# Patient Record
Sex: FEMALE | Race: BLACK OR AFRICAN AMERICAN | Employment: FULL TIME | URBAN - METROPOLITAN AREA
[De-identification: names, ages, dates, MRNs, and addresses within clinical notes are randomized per-mention and may not be internally consistent; named-entity substitution may affect disease eponyms.]

---

## 2018-12-19 ENCOUNTER — OFFICE VISIT (OUTPATIENT)
Dept: FAMILY MEDICINE CLINIC | Age: 63
End: 2018-12-19

## 2018-12-19 ENCOUNTER — TELEPHONE (OUTPATIENT)
Dept: FAMILY MEDICINE CLINIC | Age: 63
End: 2018-12-19

## 2018-12-19 VITALS
HEIGHT: 62 IN | DIASTOLIC BLOOD PRESSURE: 81 MMHG | HEART RATE: 75 BPM | SYSTOLIC BLOOD PRESSURE: 182 MMHG | BODY MASS INDEX: 45.27 KG/M2 | WEIGHT: 246 LBS | TEMPERATURE: 98.5 F

## 2018-12-19 DIAGNOSIS — I10 ESSENTIAL HYPERTENSION: ICD-10-CM

## 2018-12-19 DIAGNOSIS — M65.9 TENOSYNOVITIS OF LEFT HAND: Primary | ICD-10-CM

## 2018-12-19 PROBLEM — E66.01 OBESITY, MORBID (HCC): Status: ACTIVE | Noted: 2018-12-19

## 2018-12-19 RX ORDER — LISINOPRIL 40 MG/1
40 TABLET ORAL DAILY
Qty: 90 TAB | Refills: 3 | Status: SHIPPED | OUTPATIENT
Start: 2018-12-19 | End: 2019-10-25 | Stop reason: SDUPTHER

## 2018-12-19 NOTE — PATIENT INSTRUCTIONS
DASH Diet: Care Instructions  Your Care Instructions    The DASH diet is an eating plan that can help lower your blood pressure. DASH stands for Dietary Approaches to Stop Hypertension. Hypertension is high blood pressure. The DASH diet focuses on eating foods that are high in calcium, potassium, and magnesium. These nutrients can lower blood pressure. The foods that are highest in these nutrients are fruits, vegetables, low-fat dairy products, nuts, seeds, and legumes. But taking calcium, potassium, and magnesium supplements instead of eating foods that are high in those nutrients does not have the same effect. The DASH diet also includes whole grains, fish, and poultry. The DASH diet is one of several lifestyle changes your doctor may recommend to lower your high blood pressure. Your doctor may also want you to decrease the amount of sodium in your diet. Lowering sodium while following the DASH diet can lower blood pressure even further than just the DASH diet alone. Follow-up care is a key part of your treatment and safety. Be sure to make and go to all appointments, and call your doctor if you are having problems. It's also a good idea to know your test results and keep a list of the medicines you take. How can you care for yourself at home? Following the DASH diet  · Eat 4 to 5 servings of fruit each day. A serving is 1 medium-sized piece of fruit, ½ cup chopped or canned fruit, 1/4 cup dried fruit, or 4 ounces (½ cup) of fruit juice. Choose fruit more often than fruit juice. · Eat 4 to 5 servings of vegetables each day. A serving is 1 cup of lettuce or raw leafy vegetables, ½ cup of chopped or cooked vegetables, or 4 ounces (½ cup) of vegetable juice. Choose vegetables more often than vegetable juice. · Get 2 to 3 servings of low-fat and fat-free dairy each day. A serving is 8 ounces of milk, 1 cup of yogurt, or 1 ½ ounces of cheese. · Eat 6 to 8 servings of grains each day.  A serving is 1 slice of bread, 1 ounce of dry cereal, or ½ cup of cooked rice, pasta, or cooked cereal. Try to choose whole-grain products as much as possible. · Limit lean meat, poultry, and fish to 2 servings each day. A serving is 3 ounces, about the size of a deck of cards. · Eat 4 to 5 servings of nuts, seeds, and legumes (cooked dried beans, lentils, and split peas) each week. A serving is 1/3 cup of nuts, 2 tablespoons of seeds, or ½ cup of cooked beans or peas. · Limit fats and oils to 2 to 3 servings each day. A serving is 1 teaspoon of vegetable oil or 2 tablespoons of salad dressing. · Limit sweets and added sugars to 5 servings or less a week. A serving is 1 tablespoon jelly or jam, ½ cup sorbet, or 1 cup of lemonade. · Eat less than 2,300 milligrams (mg) of sodium a day. If you limit your sodium to 1,500 mg a day, you can lower your blood pressure even more. Tips for success  · Start small. Do not try to make dramatic changes to your diet all at once. You might feel that you are missing out on your favorite foods and then be more likely to not follow the plan. Make small changes, and stick with them. Once those changes become habit, add a few more changes. · Try some of the following:  ? Make it a goal to eat a fruit or vegetable at every meal and at snacks. This will make it easy to get the recommended amount of fruits and vegetables each day. ? Try yogurt topped with fruit and nuts for a snack or healthy dessert. ? Add lettuce, tomato, cucumber, and onion to sandwiches. ? Combine a ready-made pizza crust with low-fat mozzarella cheese and lots of vegetable toppings. Try using tomatoes, squash, spinach, broccoli, carrots, cauliflower, and onions. ? Have a variety of cut-up vegetables with a low-fat dip as an appetizer instead of chips and dip. ? Sprinkle sunflower seeds or chopped almonds over salads. Or try adding chopped walnuts or almonds to cooked vegetables.   ? Try some vegetarian meals using beans and peas. Add garbanzo or kidney beans to salads. Make burritos and tacos with mashed arauz beans or black beans. Where can you learn more? Go to http://adwoa-david.info/. Enter J846 in the search box to learn more about \"DASH Diet: Care Instructions. \"  Current as of: December 6, 2017  Content Version: 11.8  © 1674-1936 [employer]. Care instructions adapted under license by [employer] (which disclaims liability or warranty for this information). If you have questions about a medical condition or this instruction, always ask your healthcare professional. [address] any warranty or liability for your use of this information.

## 2018-12-19 NOTE — PROGRESS NOTES
HISTORY OF PRESENT ILLNESS  Rach Guthrie is a 61 y.o. female. HPI  Has been told she has hypertension. Takes lisinopril. When she had insurance [facility] to [facility] and would follow up on that but no longer has insurance. Left wrist pain. States she has carpal tunner on the left hand and has some arthritis too. For the past 2 weeks the pain started being very significant. Unable to used it for activities of daily living such as using toilet paper. Review of Systems   Constitutional: Negative for chills and fever. Respiratory: Negative for sputum production and shortness of breath. Cardiovascular: Negative for chest pain, palpitations and leg swelling. Gastrointestinal: Negative for constipation, diarrhea, heartburn, nausea and vomiting. Musculoskeletal: Positive for joint pain. /81 (BP 1 Location: Left arm, BP Patient Position: Sitting)   Pulse 75   Temp 98.5 °F (36.9 °C) (Oral)   Ht 5' 2.32\" (1.583 m)   Wt 246 lb (111.6 kg)   BMI 44.53 kg/m²   Physical Exam   Constitutional: She is oriented to person, place, and time. She appears well-developed and well-nourished. HENT:   Head: Normocephalic. Right Ear: External ear normal.   Left Ear: External ear normal.   Eyes: Conjunctivae are normal. Pupils are equal, round, and reactive to light. Neck: Normal range of motion. Neck supple. Cardiovascular: Normal rate, regular rhythm and normal heart sounds. No murmur heard. Pulmonary/Chest: Effort normal and breath sounds normal. No respiratory distress. She has no wheezes. She has no rales. Abdominal: Soft. Bowel sounds are normal. She exhibits no distension. Musculoskeletal: Normal range of motion. She exhibits tenderness. She exhibits no edema. Finkelstein test + left side   Neurological: She is alert and oriented to person, place, and time. She has normal reflexes. ASSESSMENT and PLAN  Diagnoses and all orders for this visit:    1.  Tenosynovitis of left hand    2. Essential hypertension  -     lisinopril (PRINIVIL, ZESTRIL) 40 mg tablet; Take 1 Tab by mouth daily. 20 mg kenalog in 1 mL 1% lidocaine injected to the left wrist, patient tolerated the procedure well    Blood pressure is very high. We will increase lisinopril to 40 and follow up in 3-4 weeks.   If she starts having headache, not feeling well with elevated blood pressure, she is advised to go to the ED

## 2018-12-19 NOTE — PROGRESS NOTES
The pt was not seen in discharge by the nuse. The pt left the clinic. The AVS was not given to the pt by this nurse.  Marissa Austin RN

## 2018-12-20 NOTE — TELEPHONE ENCOUNTER
TC to the [address]. Returned their call from yesterday. The Pharmacy was requesting verification of BP medication Lisinopril increase since it went from 10 mg to 40 mg. The Pharmacy just wanted to confirm that this dose increase was correct. The providers clinic note from yesterday 12/19/18 was read to the Pharmacist to confirm the dose increase to 40 mg, due to the pt's BP was very high in the clinic yesterday and the pt is to f/u with the provider in 3-4 weeks.  Juana Escobar RN

## 2019-01-17 ENCOUNTER — OFFICE VISIT (OUTPATIENT)
Dept: FAMILY MEDICINE CLINIC | Age: 64
End: 2019-01-17

## 2019-01-17 VITALS
DIASTOLIC BLOOD PRESSURE: 66 MMHG | WEIGHT: 243 LBS | BODY MASS INDEX: 44.72 KG/M2 | TEMPERATURE: 98.6 F | HEIGHT: 62 IN | SYSTOLIC BLOOD PRESSURE: 148 MMHG | HEART RATE: 72 BPM | OXYGEN SATURATION: 96 %

## 2019-01-17 DIAGNOSIS — M65.9 TENOSYNOVITIS OF LEFT HAND: Primary | ICD-10-CM

## 2019-01-17 DIAGNOSIS — I10 ESSENTIAL HYPERTENSION: ICD-10-CM

## 2019-01-17 NOTE — PROGRESS NOTES
HISTORY OF PRESENT ILLNESS  Edie Polanco is a 61 y.o. female. HPI  Mrs. Willie Carlin is here today to follow up on her left thumb pain. The pain improved about 1 week after the injection. She has been using a thumb splint most nights still. For the past week has felt the thumb is a little weak and feels a numbness and tingling sensation in the area. No pain. Compliant with the medication for blood pressure, lisinopril 40 mg  No other concerns  Review of Systems   Constitutional: Negative for chills and fever. Respiratory: Negative for sputum production and shortness of breath. Cardiovascular: Negative for chest pain, palpitations and leg swelling. Gastrointestinal: Negative for constipation, diarrhea, heartburn, nausea and vomiting. Musculoskeletal: Negative for joint pain. /66 (BP 1 Location: Right arm, BP Patient Position: Sitting)   Pulse 72   Temp 98.6 °F (37 °C) (Oral)   Ht 5' 2.32\" (1.583 m)   Wt 243 lb (110.2 kg)   SpO2 96%   BMI 43.99 kg/m²   Physical Exam   Constitutional: She is oriented to person, place, and time. She appears well-developed and well-nourished. HENT:   Head: Normocephalic and atraumatic. Right Ear: External ear normal.   Left Ear: External ear normal.   Eyes: Conjunctivae are normal. Pupils are equal, round, and reactive to light. Neck: Normal range of motion. Neck supple. Cardiovascular: Normal rate, regular rhythm and normal heart sounds. No murmur heard. Pulmonary/Chest: Effort normal and breath sounds normal. No respiratory distress. She has no wheezes. She has no rales. Abdominal: Soft. Bowel sounds are normal. She exhibits no distension. Musculoskeletal: Normal range of motion. She exhibits no edema or tenderness. Finkelstein test + left side   Neurological: She is alert and oriented to person, place, and time. She has normal reflexes. ASSESSMENT and PLAN  Diagnoses and all orders for this visit:    1. Tenosynovitis of left hand    2. Essential hypertension      Tenosynovitis improved,  I have advise to stop wearing the splint as this can cause atrophy and neuropraxia. May exercise with a stress ball.

## 2019-01-17 NOTE — PROGRESS NOTES
Coordination of Care  1. Have you been to the ER, urgent care clinic since your last visit? Hospitalized since your last visit? No    2. Have you seen or consulted any other health care providers outside of the [address] [place] [address] since your last visit? Include any pap smears or colon screening. No    Does the patient need refills?  NO    Learning Assessment Complete? no  Depression Screening complete in the past 12 months? yes

## 2019-10-25 DIAGNOSIS — I10 ESSENTIAL HYPERTENSION: ICD-10-CM

## 2019-10-28 RX ORDER — LISINOPRIL 40 MG/1
TABLET ORAL
Qty: 90 TAB | Refills: 3 | Status: SHIPPED | OUTPATIENT
Start: 2019-10-28